# Patient Record
Sex: FEMALE | HISPANIC OR LATINO | URBAN - METROPOLITAN AREA
[De-identification: names, ages, dates, MRNs, and addresses within clinical notes are randomized per-mention and may not be internally consistent; named-entity substitution may affect disease eponyms.]

---

## 2023-03-15 ENCOUNTER — WEB ENCOUNTER (OUTPATIENT)
Dept: URBAN - METROPOLITAN AREA CLINIC 60 | Facility: CLINIC | Age: 53
End: 2023-03-15

## 2023-03-15 ENCOUNTER — OFFICE VISIT (OUTPATIENT)
Dept: URBAN - METROPOLITAN AREA CLINIC 60 | Facility: CLINIC | Age: 53
End: 2023-03-15
Payer: SELF-PAY

## 2023-03-15 VITALS
OXYGEN SATURATION: 98 % | HEIGHT: 64 IN | TEMPERATURE: 97.6 F | RESPIRATION RATE: 20 BRPM | WEIGHT: 136 LBS | DIASTOLIC BLOOD PRESSURE: 62 MMHG | BODY MASS INDEX: 23.22 KG/M2 | HEART RATE: 74 BPM | SYSTOLIC BLOOD PRESSURE: 112 MMHG

## 2023-03-15 DIAGNOSIS — K57.92 DIVERTICULITIS: ICD-10-CM

## 2023-03-15 PROBLEM — 307496006: Status: ACTIVE | Noted: 2023-03-15

## 2023-03-15 PROCEDURE — 99204 OFFICE O/P NEW MOD 45 MIN: CPT | Performed by: NURSE PRACTITIONER

## 2023-03-15 NOTE — HPI-HPI
3/23 Patient is here today after she was diagnosed at the hospital with sigmoid diverticulitis around 6 weeks ago.  She was placed on treatment with ciprofloxacin and metronidazole.  Today she is in good general state she said still having some discomfort over the left lower quadrant at times.  Denies any fever, nausea, vomiting or any other GI symptoms.  We will do colonoscopy in 4-6-week.

## 2023-03-22 ENCOUNTER — LAB OUTSIDE AN ENCOUNTER (OUTPATIENT)
Dept: URBAN - METROPOLITAN AREA CLINIC 60 | Facility: CLINIC | Age: 53
End: 2023-03-22

## 2023-05-11 ENCOUNTER — CLAIMS CREATED FROM THE CLAIM WINDOW (OUTPATIENT)
Dept: URBAN - METROPOLITAN AREA CLINIC 4 | Facility: CLINIC | Age: 53
End: 2023-05-11
Payer: SELF-PAY

## 2023-05-11 ENCOUNTER — CLAIMS CREATED FROM THE CLAIM WINDOW (OUTPATIENT)
Dept: URBAN - METROPOLITAN AREA SURGERY CENTER 4 | Facility: SURGERY CENTER | Age: 53
End: 2023-05-11
Payer: SELF-PAY

## 2023-05-11 VITALS — HEIGHT: 64 IN

## 2023-05-11 DIAGNOSIS — K51.40 INFLAMMATORY POLYPS OF COLON WITHOUT COMPLICATIONS: ICD-10-CM

## 2023-05-11 DIAGNOSIS — K57.30 DIVERTCULOSIS OF LG INT W/O PERFORATION OR ABSCESS W/O BLEEDING: ICD-10-CM

## 2023-05-11 DIAGNOSIS — Z12.11 COLON CANCER SCREENING: ICD-10-CM

## 2023-05-11 DIAGNOSIS — K62.1 RECTAL POLYP: ICD-10-CM

## 2023-05-11 DIAGNOSIS — D12.5 SIGMOID POLYP: ICD-10-CM

## 2023-05-11 PROCEDURE — 88305 TISSUE EXAM BY PATHOLOGIST: CPT | Performed by: PATHOLOGY

## 2023-05-11 PROCEDURE — 45385 COLONOSCOPY W/LESION REMOVAL: CPT | Performed by: INTERNAL MEDICINE

## 2023-05-30 ENCOUNTER — CLAIMS CREATED FROM THE CLAIM WINDOW (OUTPATIENT)
Dept: URBAN - METROPOLITAN AREA CLINIC 60 | Facility: CLINIC | Age: 53
End: 2023-05-30
Payer: SELF-PAY

## 2023-05-30 ENCOUNTER — DASHBOARD ENCOUNTERS (OUTPATIENT)
Age: 53
End: 2023-05-30

## 2023-05-30 VITALS
RESPIRATION RATE: 20 BRPM | SYSTOLIC BLOOD PRESSURE: 110 MMHG | HEART RATE: 76 BPM | HEIGHT: 64 IN | BODY MASS INDEX: 23.39 KG/M2 | WEIGHT: 137 LBS | OXYGEN SATURATION: 98 % | DIASTOLIC BLOOD PRESSURE: 78 MMHG | TEMPERATURE: 97.7 F

## 2023-05-30 DIAGNOSIS — K57.30 DIVERTICULAR DISEASE OF COLON: ICD-10-CM

## 2023-05-30 DIAGNOSIS — K64.1 GRADE II HEMORRHOIDS: ICD-10-CM

## 2023-05-30 DIAGNOSIS — D12.6 ADENOMATOUS POLYP OF COLON, UNSPECIFIED PART OF COLON: ICD-10-CM

## 2023-05-30 PROBLEM — 733657002: Status: ACTIVE | Noted: 2023-05-30

## 2023-05-30 PROCEDURE — 99213 OFFICE O/P EST LOW 20 MIN: CPT | Performed by: NURSE PRACTITIONER

## 2023-05-30 NOTE — HPI-HPI
3/23 Patient is here today after she was diagnosed at the hospital with sigmoid diverticulitis around 6 weeks ago.  She was placed on treatment with ciprofloxacin and metronidazole.  Today she is in good general state she said still having some discomfort over the left lower quadrant at times.  Denies any fever, nausea, vomiting or any other GI symptoms.  We will do colonoscopy in 4-6-week. 5/23 Patient colonoscopy shows evidence of a 7 mm polyp in the sigmoid colon, two 8 mm polyp into the rectum, mild diverticular disease of the sigmoid colon, and internal hemorrhoids.  Biopsy result shows evidence of colon sigmoid polypectomy positive for granulation tissue and polyp, negative for dysplasia or malignancy.  Rectum polypectomy positive for tubular adenoma.  Today patient is in good general state she has no GI complaints her next colonoscopy will be in 3 years.